# Patient Record
Sex: FEMALE | Employment: FULL TIME | URBAN - METROPOLITAN AREA
[De-identification: names, ages, dates, MRNs, and addresses within clinical notes are randomized per-mention and may not be internally consistent; named-entity substitution may affect disease eponyms.]

---

## 2022-03-03 ENCOUNTER — OFFICE VISIT (OUTPATIENT)
Dept: ORTHOPEDIC SURGERY | Age: 37
End: 2022-03-03
Payer: COMMERCIAL

## 2022-03-03 VITALS — BODY MASS INDEX: 21.66 KG/M2 | WEIGHT: 130 LBS | HEIGHT: 65 IN

## 2022-03-03 DIAGNOSIS — S86.312A STRAIN OF PERONEAL TENDON OF LEFT FOOT, INITIAL ENCOUNTER: ICD-10-CM

## 2022-03-03 DIAGNOSIS — M79.672 LEFT FOOT PAIN: ICD-10-CM

## 2022-03-03 DIAGNOSIS — S86.012A STRAIN OF LEFT ACHILLES TENDON, INITIAL ENCOUNTER: Primary | ICD-10-CM

## 2022-03-03 DIAGNOSIS — S99.912A LEFT ANKLE INJURY, INITIAL ENCOUNTER: ICD-10-CM

## 2022-03-03 PROCEDURE — 99203 OFFICE O/P NEW LOW 30 MIN: CPT | Performed by: ORTHOPAEDIC SURGERY

## 2022-03-03 RX ORDER — SERTRALINE HYDROCHLORIDE 50 MG/1
50 TABLET, FILM COATED ORAL DAILY
COMMUNITY
Start: 2022-01-29

## 2022-03-03 NOTE — PROGRESS NOTES
Angelica Jackson (: 1985) is a 29 y.o. female, patient,here for evaluation of the following   Chief Complaint   Patient presents with    Musculoskeletal Pain     having pain in her foot and ankle after falling on her left side about a month ago. ASSESSMENT/PLAN:  Below is the assessment and plan developed based on review of pertinent history, physical exam, labs, studies, and medications. 1. Strain of left Achilles tendon, initial encounter  2. Strain of peroneal tendon of left foot, initial encounter  3. Left foot pain  -     XR STANDING FOOT LT MIN 3 V; Future  4. Left ankle injury, initial encounter  -     XR STANDING ANKLE LT MIN 3 V; Future      Patient has a strain to the peroneal tendons and the Achilles tendon and likely related to the injury sustained 4 weeks ago. Recommended stretching program as demonstrated today in clinic and provided handout of exercises to do. I did offer physical therapy but patient wants to do on her own. Recommend anti-inflammatory medications and appropriate shoe wear and insoles. Provided information on where to purchase the shoes and insoles to try, I also offered a boot if this is more severe and patient states is not that severe and seems to be improving. At length discussion had about treatment for this injury and provided handout about the condition and exercises are on this handout. No x-rays needed next time. Return in about 4 weeks (around 3/31/2022). No Known Allergies    Current Outpatient Medications   Medication Sig    sertraline (ZOLOFT) 50 mg tablet Take 50 mg by mouth daily. No current facility-administered medications for this visit. History reviewed. No pertinent past medical history. History reviewed. No pertinent surgical history. History reviewed. No pertinent family history.     Social History     Socioeconomic History    Marital status:      Spouse name: Not on file    Number of children: Not on file  Years of education: Not on file    Highest education level: Not on file   Occupational History    Not on file   Tobacco Use    Smoking status: Never Smoker    Smokeless tobacco: Never Used   Substance and Sexual Activity    Alcohol use: Never    Drug use: Never    Sexual activity: Not on file   Other Topics Concern    Not on file   Social History Narrative    Not on file     Social Determinants of Health     Financial Resource Strain:     Difficulty of Paying Living Expenses: Not on file   Food Insecurity:     Worried About Running Out of Food in the Last Year: Not on file    Vikas of Food in the Last Year: Not on file   Transportation Needs:     Lack of Transportation (Medical): Not on file    Lack of Transportation (Non-Medical): Not on file   Physical Activity:     Days of Exercise per Week: Not on file    Minutes of Exercise per Session: Not on file   Stress:     Feeling of Stress : Not on file   Social Connections:     Frequency of Communication with Friends and Family: Not on file    Frequency of Social Gatherings with Friends and Family: Not on file    Attends [employer] Services: Not on file    Active Member of [address] [place] [address] or Organizations: Not on file    Attends Club or Organization Meetings: Not on file    Marital Status: Not on file   Intimate Partner Violence:     Fear of Current or Ex-Partner: Not on file    Emotionally Abused: Not on file    Physically Abused: Not on file    Sexually Abused: Not on file   Housing Stability:     Unable to Pay for Housing in the Last Year: Not on file    Number of [place] in the Last Year: Not on file    Unstable Housing in the Last Year: Not on file           Vitals:  Ht 5' 5\" (1.651 m)   Wt 130 lb (59 kg)   BMI 21.63 kg/m²    Body mass index is 21.63 kg/m².     ROS     Positive for: Musculoskeletal (left foot and ankle )    Negative for: Constitutional, Gastrointestinal, Neurological, Skin, Genitourinary, HENT, Endocrine, Cardiovascular, Eyes, Respiratory, Psychiatric, Allergic/Imm, Heme/Lymph    Last edited by Hieu Partida on 3/3/2022  2:39 PM. (History)              SUBJECTIVE/OBJECTIVE:  Esteban Homans (: 1985)      New patient presents today with complaint of left ankle pain and it radiates down to the posterior heel. The pain is posterior and lateral.  Ongoing for the past 3 to 4 weeks. Prior to that a month ago she did fall down the stair and landed on her left foot. The pain is described as moderate dull pain that is constant associate with tingling. Bending, squatting, kneeling or walking make it worse. She is taking ibuprofen mostly. She is not diabetic, non-smoker. Physical Exam  Pleasant well-nourished female , alert and oriented to person, time and place, no acute distress. Antalgic gait, satisfactory weightbearing stance. Left lower extremity/ankle: Calf soft nontender. Tenderness of the Achilles tendon down into the insertional area of Achilles tendon the posterior heel, mild tenderness also present around the peroneal tendons, peroneal tendons not unstable on range of motion with and without resistance, mild swelling. Medial lateral malleolus nontender, ankle joint nontender. Ankle ligaments grossly stable. There is tightness to dorsiflexion on attempted dorsiflexion with knee extended, some discomfort around the posterior Achilles tendon at insertion. Mild swelling to the area. Left foot: There is tenderness at the posterior heel but negative calcaneal squeeze test, rest of hindfoot, midfoot and forefoot nontender, able to flex and extend toes satisfied range of motion and strength. Contralateral foot and ankle exam, nontender, no swelling ligaments grossly stable. Normal weightbearing stance. Neurovascular exam intact for light touch sensation, cap refill, dorsalis pedis pulse palpable, flexion/extension strength 5/5.     Skin intact without open wounds, lesions or ulcers, no skin discolorations, normal warmth to skin. Imaging:    XR Results (maximum last 2): Results from Appointment encounter on 03/03/22    XR STANDING FOOT LT MIN 3 V    Narrative  Left foot AP, lateral and oblique standing x-rays show no acute fractures or dislocations, satisfactory weightbearing stance, no significant arthritis present. Posterior heel looks normal without fractures, there is a small Jonna's. XR STANDING ANKLE LT MIN 3 V    Narrative  Left ankle AP, lateral and oblique standing x-rays show no acute fractures or dislocations. There is satisfactory bone density, no lesions, normal ankle mortise and joint. No obvious osteochondral lesion seen. An electronic signature was used to authenticate this note.   -- Lg De Paz MD

## 2022-03-03 NOTE — PATIENT INSTRUCTIONS
Ankle: Exercises  Introduction  Here are some examples of exercises for you to try. The exercises may be suggested for a condition or for rehabilitation. Start each exercise slowly. Ease off the exercises if you start to have pain. You will be told when to start these exercises and which ones will work best for you. How to do the exercises  'Alphabet' exercise    1. Trace the alphabet with your toe. This helps your ankle move in all directions. Side-to-side knee swing exercise    1. Sit in a chair with your foot flat on the floor. 2. Slowly move your knee from side to side while keeping your foot pressed flat. 3. Continue this exercise for 2 to 3 minutes. Towel curl    1. While sitting, place your foot on a towel on the floor and scrunch the towel toward you with your toes. 2. Then use your toes to push the towel away from you. 3. Make this exercise more challenging by placing a weighted object, such as a soup can, on the other end of the towel. Towel stretch    1. Sit with your legs extended and knees straight. 2. Place a towel around your foot just under the toes. 3. Hold each end of the towel in each hand, with your hands above your knees. 4. Pull back with the towel so that your foot stretches toward you. 5. Hold the position for at least 15 to 30 seconds. 6. Repeat 2 to 4 times a session, up to 5 sessions a day. Ankle eversion exercise    1. Start by sitting with your foot flat on the floor and pushing it outward against an immovable object such as the wall or heavy furniture. Hold for about 6 seconds, then relax. Repeat 8 to 12 times. 2. After you feel comfortable with this, try using rubber tubing looped around the outside of your feet for resistance. Push your foot out to the side against the tubing, and then count to 10 as you slowly bring your foot back to the middle. Repeat 8 to 12 times. Isometric opposition exercises    1.  While sitting, put your feet together flat on the floor.  2. Press your injured foot inward against your other foot. Hold for about 6 seconds, and relax. Repeat 8 to 12 times. 3. Then place the heel of your other foot on top of the injured one. Push down with the top heel while trying to push up with your injured foot. Hold for about 6 seconds, and relax. Repeat 8 to 12 times. Follow-up care is a key part of your treatment and safety. Be sure to make and go to all appointments, and call your doctor if you are having problems. It's also a good idea to know your test results and keep a list of the medicines you take. Where can you learn more? Go to http://www.gray.com/  Enter R730 in the search box to learn more about \"Ankle: Exercises. \"  Current as of: July 1, 2021               Content Version: 13.0  © 6412-6294 [employer]. Care instructions adapted under license by [employer] (which disclaims liability or warranty for this information). If you have questions about a medical condition or this instruction, always ask your healthcare professional. [address] any warranty or liability for your use of this information.

## 2022-04-30 ENCOUNTER — TELEPHONE ENCOUNTER (OUTPATIENT)
Dept: URBAN - METROPOLITAN AREA CLINIC 121 | Facility: CLINIC | Age: 37
End: 2022-04-30

## 2022-05-01 ENCOUNTER — TELEPHONE ENCOUNTER (OUTPATIENT)
Dept: URBAN - METROPOLITAN AREA CLINIC 121 | Facility: CLINIC | Age: 37
End: 2022-05-01

## 2022-05-01 RX ORDER — HYDROCORTISONE ACETATE AND PRAMOXINE HYDROCHLORIDE 25; 10 MG/G; MG/G
APPLY PR TID CREAM TOPICAL
Status: ACTIVE | COMMUNITY
Start: 2016-01-14

## 2022-05-01 RX ORDER — SERTRALINE HCL 25 MG
TABLET ORAL
Status: ACTIVE | COMMUNITY
Start: 2016-01-14

## 2022-05-01 RX ORDER — JUNEL FE 20; 1 UG/1; MG/1
TABLET ORAL
Status: ACTIVE | COMMUNITY
Start: 2016-01-14

## 2022-05-01 RX ORDER — CLONAZEPAM 1 MG/1
TABLET ORAL
Status: ACTIVE | COMMUNITY
Start: 2016-01-14